# Patient Record
(demographics unavailable — no encounter records)

---

## 2024-10-31 NOTE — HISTORY OF PRESENT ILLNESS
[de-identified] : Patient is here for evaluation of both knees and bothering her off and on for 2 years now he points to the medial aspect of the left knee as the chief complaint main source of pain she has had an injection about a year ago in the right knee with no relief she has history of no allergies she is on no significant medicines occasional over-the-counter anti-inflammatories  Physical exam she is got left knee pause Mark's medially small knee effusion guarded range of motion but stable negative Homans' sign no erythema Physical exam right knee has some some patellofemoral crepitus pain diffusely more laterally and medially ligaments are stable negative Homans' sign  X-rays AP and lateral standing taken today in office show Kellgren-Jm grade 1 on right knee lateral compartment  well-maintained joint spaces on the left no soft tissue calcifications bilaterally  Recommend prescription anti-inflammatories physical therapy for both knees as well as MRI of the left knee to evaluate the medial meniscus we will see her back in about 3 to 4 weeks to discuss MRI results and all options if it does confirm a medial meniscal tear in the left knee